# Patient Record
Sex: FEMALE | Race: WHITE | NOT HISPANIC OR LATINO | Employment: FULL TIME | URBAN - METROPOLITAN AREA
[De-identification: names, ages, dates, MRNs, and addresses within clinical notes are randomized per-mention and may not be internally consistent; named-entity substitution may affect disease eponyms.]

---

## 2024-11-05 ENCOUNTER — OFFICE VISIT (OUTPATIENT)
Dept: PRIMARY CARE | Facility: CLINIC | Age: 19
End: 2024-11-05
Payer: COMMERCIAL

## 2024-11-05 VITALS
TEMPERATURE: 98 F | RESPIRATION RATE: 17 BRPM | WEIGHT: 123 LBS | BODY MASS INDEX: 22.5 KG/M2 | DIASTOLIC BLOOD PRESSURE: 70 MMHG | SYSTOLIC BLOOD PRESSURE: 110 MMHG | HEART RATE: 92 BPM

## 2024-11-05 DIAGNOSIS — H01.111: Primary | ICD-10-CM

## 2024-11-05 DIAGNOSIS — H01.114: Primary | ICD-10-CM

## 2024-11-05 PROCEDURE — 1036F TOBACCO NON-USER: CPT | Performed by: NURSE PRACTITIONER

## 2024-11-05 PROCEDURE — 99213 OFFICE O/P EST LOW 20 MIN: CPT | Performed by: NURSE PRACTITIONER

## 2024-11-05 RX ORDER — METHYLPREDNISOLONE 4 MG/1
TABLET ORAL
Qty: 21 TABLET | Refills: 0 | Status: SHIPPED | OUTPATIENT
Start: 2024-11-05 | End: 2024-11-12

## 2024-11-05 RX ORDER — CETIRIZINE HYDROCHLORIDE 10 MG/1
10 TABLET ORAL DAILY
Qty: 30 TABLET | Refills: 0 | Status: SHIPPED | OUTPATIENT
Start: 2024-11-05 | End: 2024-12-05

## 2024-11-05 RX ORDER — MEDROXYPROGESTERONE ACETATE 150 MG/ML
150 INJECTION, SUSPENSION INTRAMUSCULAR
COMMUNITY
Start: 2023-10-03

## 2024-11-05 ASSESSMENT — ENCOUNTER SYMPTOMS
CARDIOVASCULAR NEGATIVE: 1
MUSCULOSKELETAL NEGATIVE: 1
GASTROINTESTINAL NEGATIVE: 1
APPETITE CHANGE: 0
EYE REDNESS: 0
FEVER: 0
PHOTOPHOBIA: 0
FATIGUE: 0
RESPIRATORY NEGATIVE: 1
EYE ITCHING: 0
EYE PAIN: 0
EYE DISCHARGE: 0
CHILLS: 0

## 2024-11-05 NOTE — PROGRESS NOTES
Patient says that about two weeks ago, she noticed some eyelid swelling on both eyes. She says that the skin of her eyelids feels tight and burning and itchy. In the morning, they are dry, almost flaky. No new soaps, lotions, detergents or makeup used in the area. Pt has been using these eyelid wipes (ocusoft) for the past five days and they somewhat seem to help. No known injury to the eye. No change in vision. Feeling well.    Review of Systems   Constitutional:  Negative for appetite change, chills, fatigue and fever.   HENT: Negative.     Eyes:  Negative for photophobia, pain, discharge, redness, itching and visual disturbance.        Eyelid itching and redness   Respiratory: Negative.     Cardiovascular: Negative.    Gastrointestinal: Negative.    Musculoskeletal: Negative.      Objective   /70   Pulse 92   Temp 36.7 °C (98 °F) (Temporal)   Resp 17   Wt 55.8 kg (123 lb)   BMI 22.50 kg/m²     Physical Exam  Vitals reviewed.   Constitutional:       General: She is not in acute distress.     Appearance: Normal appearance. She is not ill-appearing or toxic-appearing.   HENT:      Head: Atraumatic.   Eyes:      Conjunctiva/sclera: Conjunctivae normal.      Comments: Patient's eyelids are slightly swollen and erythematous bilaterally. There is no s/s of infection   Cardiovascular:      Rate and Rhythm: Normal rate and regular rhythm.      Heart sounds: Normal heart sounds. No murmur heard.  Pulmonary:      Effort: Pulmonary effort is normal.      Breath sounds: Normal breath sounds. No wheezing or rhonchi.   Skin:     General: Skin is warm and dry.   Neurological:      General: No focal deficit present.      Mental Status: She is alert.   Psychiatric:         Mood and Affect: Mood normal.     Assessment/Plan   Problem List Items Addressed This Visit    None  Visit Diagnoses         Codes    Contact dermatitis of both upper eyelids    -  Primary H01.111, H01.114    Relevant Medications    methylPREDNISolone  (Medrol Dospak) 4 mg tablets    cetirizine (ZyrTEC) 10 mg tablet        Patient started on medrol anum and zyrtec at this time. Patient advised to avoid any makeup for the next few days. She may stop using the eyelid wipes as well. Pt to wash her face with cool water and hypoallergenic/fragrance free soap. Discussed that patient is to avoid the use anti-inflammatories while on the steroids; she agreed. Pt to follow up in 2-3 days if no better. Will also refer to dermatology.

## 2024-11-25 ENCOUNTER — OFFICE VISIT (OUTPATIENT)
Dept: PRIMARY CARE | Facility: CLINIC | Age: 19
End: 2024-11-25
Payer: COMMERCIAL

## 2024-11-25 VITALS
OXYGEN SATURATION: 93 % | BODY MASS INDEX: 22.64 KG/M2 | HEART RATE: 100 BPM | SYSTOLIC BLOOD PRESSURE: 112 MMHG | WEIGHT: 123.8 LBS | TEMPERATURE: 98.7 F | DIASTOLIC BLOOD PRESSURE: 75 MMHG | RESPIRATION RATE: 20 BRPM

## 2024-11-25 DIAGNOSIS — J06.9 VIRAL URI WITH COUGH: Primary | ICD-10-CM

## 2024-11-25 DIAGNOSIS — R19.7 DIARRHEA, UNSPECIFIED TYPE: ICD-10-CM

## 2024-11-25 DIAGNOSIS — J02.9 SORE THROAT: ICD-10-CM

## 2024-11-25 LAB
POC RAPID INFLUENZA A: NEGATIVE
POC RAPID INFLUENZA B: NEGATIVE
POC SARS-COV-2 AG BINAX: NORMAL

## 2024-11-25 PROCEDURE — 87811 SARS-COV-2 COVID19 W/OPTIC: CPT | Performed by: NURSE PRACTITIONER

## 2024-11-25 PROCEDURE — 99213 OFFICE O/P EST LOW 20 MIN: CPT | Performed by: NURSE PRACTITIONER

## 2024-11-25 PROCEDURE — 87804 INFLUENZA ASSAY W/OPTIC: CPT | Performed by: NURSE PRACTITIONER

## 2024-11-25 ASSESSMENT — ENCOUNTER SYMPTOMS
SHORTNESS OF BREATH: 0
DIARRHEA: 1
NAUSEA: 0
RHINORRHEA: 1
VOICE CHANGE: 1
WHEEZING: 0
FEVER: 0
FATIGUE: 1
VOMITING: 0
CHILLS: 0
COUGH: 1
APPETITE CHANGE: 1
SORE THROAT: 1
HEADACHES: 1

## 2024-11-25 NOTE — PROGRESS NOTES
Subjective   Kasie Parra is a 19 y.o. female who presents for Cough, Diarrhea, Nasal Congestion, and Headache.  Cough  Associated symptoms include headaches, rhinorrhea and a sore throat. Pertinent negatives include no chills, fever, shortness of breath or wheezing.   Diarrhea   Associated symptoms include coughing and headaches. Pertinent negatives include no chills, fever or vomiting.   Headache   Associated symptoms include coughing, rhinorrhea and a sore throat. Pertinent negatives include no fever, nausea or vomiting.    She initially felt general malaise 8 days ago with nasal congestion. Started to feel better, but then last Friday (3 days ago) symptoms became much worse with laryngitis. Headache, body aches, nasal congestion has changed to rhinorrhea, coughing up a lot of phlegm with a hint of green, and now today also having diarrhea.   Review of Systems   Constitutional:  Positive for appetite change (decreased. appetite. Only had a milkshake yesterday and water and tea) and fatigue. Negative for chills and fever.   HENT:  Positive for congestion, rhinorrhea, sore throat and voice change.    Respiratory:  Positive for cough. Negative for shortness of breath and wheezing.    Gastrointestinal:  Positive for diarrhea. Negative for nausea and vomiting.   Neurological:  Positive for headaches.     Objective   Physical Exam  Vitals reviewed.   Constitutional:       Appearance: Normal appearance. She is ill-appearing.   HENT:      Head: Normocephalic.      Nose: Rhinorrhea present.   Eyes:      Conjunctiva/sclera: Conjunctivae normal.   Cardiovascular:      Rate and Rhythm: Normal rate and regular rhythm.      Pulses: Normal pulses.      Heart sounds: No murmur heard.  Pulmonary:      Effort: Pulmonary effort is normal.      Breath sounds: Normal breath sounds.   Abdominal:      General: Bowel sounds are normal.      Palpations: Abdomen is soft.   Musculoskeletal:      Cervical back: Neck supple.       Right lower leg: No edema.      Left lower leg: No edema.   Lymphadenopathy:      Cervical: No cervical adenopathy.   Skin:     General: Skin is warm and dry.   Neurological:      General: No focal deficit present.      Mental Status: She is alert and oriented to person, place, and time.   Psychiatric:         Mood and Affect: Mood normal.         Thought Content: Thought content normal.     /75   Pulse 100   Temp 37.1 °C (98.7 °F) (Tympanic)   Resp 20   Wt 56.2 kg (123 lb 12.8 oz)   SpO2 93%   BMI 22.64 kg/m²   Assessment/Plan   Problem List Items Addressed This Visit    None  Visit Diagnoses       Viral URI with cough    -  Primary    Sore throat        Relevant Orders    POCT BinaxNOW Covid-19 Ag Card manually resulted (Completed)    POCT Influenza A/B manually resulted (Completed)    Diarrhea, unspecified type        6 episodes watery diarrhea today. Advised PRN imodium and BRAT diet. Add Gatorade.          Increase oral fluids/water, at least eight 8-oz glasses/day.  Get plenty of rest.  Cepacol lozenges and/or Chloraseptic spray PRN for sore throat. Salt water gargle or broth for comfort.  Alternate Tylenol/Ibuprofen PRN for body aches and/or fever  Saline nasal spray PRN for rhinitis.  Guaifenessin/Guaifenissin DM PRN for cough

## 2024-12-02 ENCOUNTER — OFFICE VISIT (OUTPATIENT)
Dept: URGENT CARE | Age: 19
End: 2024-12-02
Payer: COMMERCIAL

## 2024-12-02 VITALS
TEMPERATURE: 98.2 F | SYSTOLIC BLOOD PRESSURE: 114 MMHG | WEIGHT: 123 LBS | BODY MASS INDEX: 22.5 KG/M2 | RESPIRATION RATE: 15 BRPM | HEART RATE: 98 BPM | DIASTOLIC BLOOD PRESSURE: 73 MMHG | OXYGEN SATURATION: 100 %

## 2024-12-02 DIAGNOSIS — L01.00 IMPETIGO: ICD-10-CM

## 2024-12-02 DIAGNOSIS — J01.00 ACUTE NON-RECURRENT MAXILLARY SINUSITIS: ICD-10-CM

## 2024-12-02 DIAGNOSIS — J02.9 SORE THROAT: Primary | ICD-10-CM

## 2024-12-02 LAB — POC RAPID STREP: NEGATIVE

## 2024-12-02 RX ORDER — AMOXICILLIN AND CLAVULANATE POTASSIUM 875; 125 MG/1; MG/1
1 TABLET, FILM COATED ORAL 2 TIMES DAILY
Qty: 20 TABLET | Refills: 0 | Status: SHIPPED | OUTPATIENT
Start: 2024-12-02 | End: 2024-12-12

## 2024-12-02 ASSESSMENT — ENCOUNTER SYMPTOMS: SORE THROAT: 1

## 2024-12-02 NOTE — PROGRESS NOTES
Subjective   Patient ID: Kasie Parra is a 19 y.o. female. They present today with a chief complaint of Sore Throat (Week and a half; rash on upper lip. No improvemnt).    History of Present Illness  Subjective  Kasie Parra is a 19 y.o. female who presents for evaluation of symptoms of a URI. Symptoms include lightheadedness, nasal congestion, sinus pressure, sore throat, and facial rash. Onset of symptoms was 2 weeks ago and has been gradually worsening since that time. Treatment to date: decongestants and Mupirocin for facial rash.          Sore Throat         Past Medical History  Allergies as of 12/02/2024    (No Known Allergies)       (Not in a hospital admission)       History reviewed. No pertinent past medical history.    Past Surgical History:   Procedure Laterality Date    OTHER SURGICAL HISTORY  09/17/2019    No history of surgery        reports that she has never smoked. She has never used smokeless tobacco.    Review of Systems  Review of Systems   HENT:  Positive for sore throat.                                   Objective    Vitals:    12/02/24 1831   BP: 114/73   Pulse: 98   Resp: 15   Temp: 36.8 °C (98.2 °F)   SpO2: 100%   Weight: 55.8 kg (123 lb)     No LMP recorded. Patient has had an injection.    Physical Exam  Constitutional:       General: She is not in acute distress.     Appearance: Normal appearance.   HENT:      Right Ear: Tympanic membrane, ear canal and external ear normal.      Left Ear: Tympanic membrane, ear canal and external ear normal.      Nose: Congestion and rhinorrhea present.      Mouth/Throat:      Mouth: Mucous membranes are moist.      Pharynx: Oropharyngeal exudate present.   Eyes:      Extraocular Movements: Extraocular movements intact.      Conjunctiva/sclera: Conjunctivae normal.      Pupils: Pupils are equal, round, and reactive to light.   Cardiovascular:      Rate and Rhythm: Normal rate and regular rhythm.      Pulses: Normal pulses.       Heart sounds: Normal heart sounds.   Pulmonary:      Effort: Pulmonary effort is normal.   Musculoskeletal:      Cervical back: Normal range of motion and neck supple. No rigidity or tenderness.   Lymphadenopathy:      Cervical: No cervical adenopathy.   Skin:     Comments: Erythematous pustules with honey crusted lesions above left upper lip   Neurological:      Mental Status: She is alert.         Procedures    Point of Care Test & Imaging Results from this visit  Results for orders placed or performed in visit on 12/02/24   POCT rapid strep A manually resulted   Result Value Ref Range    POC Rapid Strep Negative Negative      No results found.    Diagnostic study results (if any) were reviewed by Chandrika Suh MD.    Assessment/Plan   Allergies, medications, history, and pertinent labs/EKGs/Imaging reviewed by Chandrika Suh MD.       Orders and Diagnoses  Diagnoses and all orders for this visit:  Sore throat  -     POCT rapid strep A manually resulted      Medical Admin Record      Patient disposition: Home    Electronically signed by Chandrika Suh MD  6:46 PM

## 2024-12-02 NOTE — PATIENT INSTRUCTIONS
Antibiotics as directed; take with food. Decongestants, nasal saline as needed.  Continue Mupirocin as directed.  Follow up with new or worsening symptoms.

## 2024-12-09 ENCOUNTER — OFFICE VISIT (OUTPATIENT)
Dept: PRIMARY CARE | Facility: CLINIC | Age: 19
End: 2024-12-09
Payer: COMMERCIAL

## 2024-12-09 VITALS
TEMPERATURE: 98.2 F | HEIGHT: 62 IN | BODY MASS INDEX: 22.63 KG/M2 | SYSTOLIC BLOOD PRESSURE: 118 MMHG | WEIGHT: 123 LBS | RESPIRATION RATE: 16 BRPM | DIASTOLIC BLOOD PRESSURE: 70 MMHG | HEART RATE: 110 BPM

## 2024-12-09 DIAGNOSIS — L01.00 IMPETIGO: ICD-10-CM

## 2024-12-09 DIAGNOSIS — J18.9 PNEUMONIA OF LEFT LOWER LOBE DUE TO INFECTIOUS ORGANISM: Primary | ICD-10-CM

## 2024-12-09 PROCEDURE — 3008F BODY MASS INDEX DOCD: CPT | Performed by: FAMILY MEDICINE

## 2024-12-09 PROCEDURE — 99214 OFFICE O/P EST MOD 30 MIN: CPT | Performed by: FAMILY MEDICINE

## 2024-12-09 RX ORDER — MUPIROCIN CALCIUM 20 MG/G
CREAM TOPICAL 3 TIMES DAILY
COMMUNITY

## 2024-12-09 RX ORDER — DOXYCYCLINE 100 MG/1
CAPSULE ORAL
COMMUNITY
Start: 2024-12-07

## 2024-12-09 RX ORDER — MUPIROCIN 20 MG/G
OINTMENT TOPICAL 3 TIMES DAILY
Qty: 44 G | Refills: 1 | Status: SHIPPED | OUTPATIENT
Start: 2024-12-09 | End: 2024-12-19

## 2024-12-09 NOTE — PROGRESS NOTES
"Kasie Parra is a 19 y.o. female here today for   Chief Complaint   Patient presents with    Follow-up     Several visits, past month     Impetigo     Face     Pneumonia     Diagnosed 12/7        HPI     Dxed with pneumonia 2 days ago.  Now on doxycycline.  Dxed with left lower lobe pneumonia by CXR.   Still with a cough but improving.  No F/C ever with this.  Covid was negative.  I cannot see the CXR - was in [place] urgent [facility].  Says that her cough has improved somewhat now and she is feeling a little better.  She is no longer having any fevers.    Dxed about 3 weeks earlier with impetigo.  Has had crusty yellow skin lesions under her nose and on her right superior ear and in her pubic area.  Using mupirocin and was on Amox and then recently changed to Doxy.  Also with a few spots in pubic hair and creases.  Took swabs at  recently but cultures pending.  She did have HSV antibodies done about 1 month ago randomly with her gynecologist and they were negative.  I was able to see these results.  The impetigo rashes do seem to be slowly improving now.      Current Outpatient Medications:     doxycycline (Vibramycin) 100 mg capsule, TAKE 1 CAPSULE TWICE DAILY FOR 10 DAYS, Disp: , Rfl:     medroxyPROGESTERone 150 mg/mL injection syringe, Inject 1 mL (150 mg) into the muscle., Disp: , Rfl:     mupirocin (Bactroban) 2 % cream, Apply topically 3 times a day., Disp: , Rfl:     amoxicillin-pot clavulanate (Augmentin) 875-125 mg tablet, Take 1 tablet by mouth 2 times a day for 10 days. (Patient not taking: Reported on 12/9/2024), Disp: 20 tablet, Rfl: 0    cetirizine (ZyrTEC) 10 mg tablet, Take 1 tablet (10 mg) by mouth once daily., Disp: 30 tablet, Rfl: 0    There is no problem list on file for this patient.        Objective    Visit Vitals    Visit Vitals  /70   Pulse 110   Temp 36.8 °C (98.2 °F)   Resp 16   Ht 1.575 m (5' 2\")   Wt 55.8 kg (123 lb)   BMI 22.50 kg/m²   OB Status Injection "   Smoking Status Never   BSA 1.56 m²       Body mass index is 22.5 kg/m².     Physical Exam     Lungs-I can hear coarse rales in her left lower lobe but nowhere else.  She is having normal respiratory effort with no distress.    Skin-under her nose and on the right superior ear and in the pubic area are areas of scabbing with some yellowish crusting.  There are no blisters or pustules present.  The skin in the pubic area does not look like classic herpes lesions and looks like impetigo.    Assessment & Plan  Pneumonia of left lower lobe due to infectious organism  She should finish the doxycycline and I would like to follow-up in 1 week for recheck.  She will probably need a repeat chest x-ray in a few weeks.       Impetigo  This seems to be improving with doxycycline and I will give her a prescription for mupirocin because she has been using her mother's.  Much education given on condition, cause, possible treatments and outcomes.  We will recheck this in 1 week also.  Orders:    mupirocin (Bactroban) 2 % ointment; Apply topically 3 times a day for 10 days. apply to affected area               No orders of the defined types were placed in this encounter.       New Medications Ordered This Visit   Medications    doxycycline (Vibramycin) 100 mg capsule     Sig: TAKE 1 CAPSULE TWICE DAILY FOR 10 DAYS    mupirocin (Bactroban) 2 % cream     Sig: Apply topically 3 times a day.

## 2024-12-17 ENCOUNTER — APPOINTMENT (OUTPATIENT)
Dept: PRIMARY CARE | Facility: CLINIC | Age: 19
End: 2024-12-17
Payer: COMMERCIAL

## 2024-12-18 ENCOUNTER — APPOINTMENT (OUTPATIENT)
Dept: PRIMARY CARE | Facility: CLINIC | Age: 19
End: 2024-12-18
Payer: COMMERCIAL

## 2024-12-18 ENCOUNTER — OFFICE VISIT (OUTPATIENT)
Dept: PRIMARY CARE | Facility: CLINIC | Age: 19
End: 2024-12-18
Payer: COMMERCIAL

## 2024-12-18 VITALS
HEART RATE: 106 BPM | SYSTOLIC BLOOD PRESSURE: 118 MMHG | BODY MASS INDEX: 22.63 KG/M2 | HEIGHT: 62 IN | DIASTOLIC BLOOD PRESSURE: 80 MMHG | RESPIRATION RATE: 16 BRPM | TEMPERATURE: 98.1 F | WEIGHT: 123 LBS

## 2024-12-18 DIAGNOSIS — L01.00 IMPETIGO: ICD-10-CM

## 2024-12-18 DIAGNOSIS — J18.9 PNEUMONIA OF LEFT LOWER LOBE DUE TO INFECTIOUS ORGANISM: Primary | ICD-10-CM

## 2024-12-18 PROCEDURE — 3008F BODY MASS INDEX DOCD: CPT | Performed by: FAMILY MEDICINE

## 2024-12-18 PROCEDURE — 1036F TOBACCO NON-USER: CPT | Performed by: FAMILY MEDICINE

## 2024-12-18 PROCEDURE — 99214 OFFICE O/P EST MOD 30 MIN: CPT | Performed by: FAMILY MEDICINE

## 2024-12-18 NOTE — PROGRESS NOTES
"Kasie Parra is a 19 y.o. female here today for   Chief Complaint   Patient presents with    Follow-up     Pneumonia and impetigo        HPI     LLL pneumonia - finished doxy yesterday.  No fever.  Minor cough is present but improving.  Some pain over left ribs with cough or sneeze.  She feels like her energy has returned to normal and she is feeling almost 100% again.    Impetigo continues to improve.  Using mupirocin.  The area in her pubic region seems to have completely resolved.  The area on her face above the lip continues to improve.  There is still some erythema but the crusting and flakiness have resolved.    Current Outpatient Medications:     medroxyPROGESTERone 150 mg/mL injection syringe, Inject 1 mL (150 mg) into the muscle., Disp: , Rfl:     mupirocin (Bactroban) 2 % ointment, Apply topically 3 times a day for 10 days. apply to affected area, Disp: 44 g, Rfl: 1    cetirizine (ZyrTEC) 10 mg tablet, Take 1 tablet (10 mg) by mouth once daily., Disp: 30 tablet, Rfl: 0    doxycycline (Vibramycin) 100 mg capsule, TAKE 1 CAPSULE TWICE DAILY FOR 10 DAYS, Disp: , Rfl:     mupirocin (Bactroban) 2 % cream, Apply topically 3 times a day., Disp: , Rfl:     There is no problem list on file for this patient.        Objective    Visit Vitals    Visit Vitals  /80   Pulse 106   Temp 36.7 °C (98.1 °F)   Resp 16   Ht 1.575 m (5' 2\")   Wt 55.8 kg (123 lb)   BMI 22.50 kg/m²   OB Status Injection   Smoking Status Never   BSA 1.56 m²       Body mass index is 22.5 kg/m².     Physical Exam     Lungs-clear to auscultation in all fields.  There is some tenderness over the anterior and lateral lower ribs but it is minimal.  There is no tenderness over the ribs specifically.    Skin-the area over her pubic skin seems to have resolved with only a small scar but no erythema.  The area on her face above her lip has improved but there still is some mild erythema.  There is no induration or crusting.    Assessment & " Plan  Pneumonia of left lower lobe due to infectious organism  This seems to be resolving and we will get a follow-up chest x-ray in about 3 weeks to ensure resolution.  She apparently had a chest x-ray at the urgent care which showed left lower lobe pneumonia but I do not have these results.  Orders:    XR chest 2 views; Future    Impetigo  This is improving with mupirocin and I recommend that she continue it on her face until the erythema has faded.  I recommend to call us and make a follow up appointment if sxs worsen or do not resolve.                        Orders Placed This Encounter   Procedures    XR chest 2 views        No orders of the defined types were placed in this encounter.